# Patient Record
Sex: MALE | Race: WHITE | NOT HISPANIC OR LATINO | Employment: OTHER | URBAN - NONMETROPOLITAN AREA
[De-identification: names, ages, dates, MRNs, and addresses within clinical notes are randomized per-mention and may not be internally consistent; named-entity substitution may affect disease eponyms.]

---

## 2019-02-26 ENCOUNTER — OFFICE VISIT (OUTPATIENT)
Dept: CARDIAC SURGERY | Facility: CLINIC | Age: 74
End: 2019-02-26

## 2019-02-26 VITALS
SYSTOLIC BLOOD PRESSURE: 137 MMHG | HEART RATE: 65 BPM | BODY MASS INDEX: 27.58 KG/M2 | WEIGHT: 182 LBS | DIASTOLIC BLOOD PRESSURE: 76 MMHG | OXYGEN SATURATION: 97 % | HEIGHT: 68 IN

## 2019-02-26 DIAGNOSIS — I65.23 BILATERAL CAROTID ARTERY STENOSIS: Primary | ICD-10-CM

## 2019-02-26 PROCEDURE — 99213 OFFICE O/P EST LOW 20 MIN: CPT | Performed by: THORACIC SURGERY (CARDIOTHORACIC VASCULAR SURGERY)

## 2019-02-26 RX ORDER — GLUCOSAMINE SULFATE 500 MG
1000 CAPSULE ORAL DAILY
COMMUNITY

## 2019-02-26 RX ORDER — RANITIDINE 300 MG/1
300 TABLET ORAL NIGHTLY
COMMUNITY
End: 2020-11-19

## 2019-02-26 RX ORDER — LEVOTHYROXINE SODIUM 0.05 MG/1
50 TABLET ORAL DAILY
COMMUNITY

## 2019-03-15 DIAGNOSIS — Z00.6 EXAMINATION FOR NORMAL COMPARISON FOR CLINICAL RESEARCH: Primary | ICD-10-CM

## 2020-09-15 ENCOUNTER — OFFICE VISIT (OUTPATIENT)
Dept: CARDIAC SURGERY | Facility: CLINIC | Age: 75
End: 2020-09-15

## 2020-09-15 ENCOUNTER — TELEPHONE (OUTPATIENT)
Dept: CARDIAC SURGERY | Facility: CLINIC | Age: 75
End: 2020-09-15

## 2020-09-15 VITALS
OXYGEN SATURATION: 97 % | TEMPERATURE: 98.2 F | HEART RATE: 74 BPM | DIASTOLIC BLOOD PRESSURE: 81 MMHG | HEIGHT: 68 IN | BODY MASS INDEX: 28.52 KG/M2 | WEIGHT: 188.2 LBS | SYSTOLIC BLOOD PRESSURE: 144 MMHG

## 2020-09-15 DIAGNOSIS — I65.23 BILATERAL CAROTID ARTERY STENOSIS: Primary | ICD-10-CM

## 2020-09-15 PROCEDURE — 99212 OFFICE O/P EST SF 10 MIN: CPT | Performed by: PHYSICIAN ASSISTANT

## 2020-09-15 RX ORDER — OMEPRAZOLE 20 MG/1
1 CAPSULE, DELAYED RELEASE ORAL DAILY
COMMUNITY
Start: 2020-08-12

## 2020-09-15 NOTE — PROGRESS NOTES
09/15/2020  Patient Information  Wiliam Bergeron                                                                                          [address] [place] [id number]   1945  'PCP/Referring Physician'  Otis Zuñiga MD  [phone]  No ref. provider found    Chief Complaint   Patient presents with   • Carotid Artery Disease     Follow-up with results from CTA neck for carotid artery stenosis        History of Present Illness:  75-year-old male with a history of hypertension, hyperlipidemia, diabetes mellitus and remote tobacco use.  Patient known to our practice having had a right carotid endarterectomy performed on 8/4/2016 by Dr. Navarro.  He did well from that surgery.  He reports at the time there was little to no disease noted in his left carotid artery.  Dr. Mom has since left the practice and the patient was last seen here according to notes on 2/26/2019 by Dr. Mondragon.  He was told during that follow-up that he did not need any surgery on his left carotid and that he would be followed up in 2 years with a surveilling carotid duplex.  Patient reports over the last month having a couple of instances of what sounds like near syncopal episodes.  Initially believed it was due to a thyroid  medicine that he was prescribed.  He reported this to his physician Dr. Zuñiga in [facility].  Initially sent for a carotid duplex scan which reportedly did not show any significant disease in the carotid arteries.  However his physician Dr. Zuñiga felt that further evaluation was warranted.  He ordered a CTA of the carotids.  Patient is is here today for evaluation and review of the images.  Per the report he has a 70% stenosis of his left internal carotid artery.  Currently is having no symptoms.  Denies weakness on his right or left side.  Denies visual changes.  Denies speech changes    Patient Active Problem List   Diagnosis   • Carotid stenosis   • Hypertension   • Hyperlipidemia   • Urinary  retention     Past Medical History:   Diagnosis Date   • Arthritis    • Back pain    • Carotid artery disease (CMS/HCC)    • Cataract    • Chronic kidney disease (CKD)    • Colon cancer (CMS/HCC)    • Creatinine elevation     MONITORED BY FMD-DR. BJ POON, GETS CHECKED EVERY FEW MONTHS   • Depression    • Diabetes (CMS/HCC)    • Dizziness    • GERD (gastroesophageal reflux disease)    • Hepatitis    • Hyperlipidemia    • Hypertension     CHECKS AT HOME, SAYS HAS BEEN RUNNING JUST A LITTLE LOW RECENTLY BUT NOT EXTREMELY LOW   • Inguinal hernia     right side   • Kidney disease    • PONV (postoperative nausea and vomiting)     ONLY WITH COLON SURGERY   • Skin cancer     right cheek   • Tobacco abuse, in remission    • Wears dentures    • Wears glasses      Past Surgical History:   Procedure Laterality Date   • CAROTID ENDARTERECTOMY      right   • CAROTID ENDARTERECTOMY Right 8/4/2016    Procedure: RIGHT CAROTID ENDARTERECTOMY WITH EEG;  Surgeon: Williams Navarro MD;  Location: [facility] OR;  Service:    • CATARACT EXTRACTION Left    • CEREBRAL ANGIOGRAM Bilateral 06/28/2016   • COLON RESECTION     • COLONOSCOPY  2006    polyps   • GALLBLADDER SURGERY     • HERNIA REPAIR Right     ingunial   • INTERVENTIONAL RADIOLOGY PROCEDURE Bilateral 6/28/2016    Procedure: Carotid Cerebral Angiogram;  Surgeon: Teddy Lubin MD;  Location:  [facility] CATH INVASIVE LOCATION;  Service:    • SKIN CANCER EXCISION      right cheek, with graft from below eyebrow       Current Outpatient Medications:   •  aspirin  MG EC tablet, Take 1 tablet by mouth daily. (Patient taking differently: Take 81 mg by mouth Daily.), Disp: 30 tablet, Rfl: 2  •  enalapril (VASOTEC) 20 MG tablet, Take 10 mg by mouth 2 (Two) Times a Day., Disp: , Rfl:   •  Glucosamine 500 MG capsule, Take 1,000 mg by mouth Daily., Disp: , Rfl:   •  levothyroxine (SYNTHROID, LEVOTHROID) 50 MCG tablet, Take 50 mcg by mouth Daily., Disp: , Rfl:   •  metoprolol tartrate  (LOPRESSOR) 50 MG tablet, Take 25 mg by mouth 2 (two) times a day., Disp: , Rfl:   •  Multiple Vitamin (MULTI VITAMIN DAILY PO), Take 1 tablet by mouth daily., Disp: , Rfl:   •  NIFEdipine XL (PROCARDIA XL) 90 MG 24 hr tablet, Take 90 mg by mouth daily., Disp: , Rfl:   •  Omega-3 Fatty Acids (FISH OIL) 1000 MG capsule capsule, Take 1 capsule by mouth daily with breakfast., Disp: , Rfl:   •  omeprazole (priLOSEC) 20 MG capsule, Take 1 capsule by mouth Daily., Disp: , Rfl:   •  rosuvastatin (CRESTOR) 20 MG tablet, Take 20 mg by mouth daily., Disp: , Rfl:   •  oxyCODONE-acetaminophen (PERCOCET) 5-325 MG per tablet, Take 1 tablet by mouth 1 (one) time as needed for moderate pain (4-6) (1 tablet every 6 hours prn)., Disp: , Rfl:   •  pantoprazole (PROTONIX) 40 MG EC tablet, Take 40 mg by mouth daily., Disp: , Rfl:   •  raNITIdine (ZANTAC) 300 MG tablet, Take 300 mg by mouth Every Night., Disp: , Rfl:   Allergies   Allergen Reactions   • Augmentin [Amoxicillin-Pot Clavulanate] Rash   • Penicillins Rash     Social History     Socioeconomic History   • Marital status:      Spouse name: Not on file   • Number of children: 2   • Years of education: 13   • Highest education level: Not on file   Occupational History   • Occupation: RETIRED/[employer]     Comment: [employer]    Tobacco Use   • Smoking status: Former Smoker     Packs/day: 3.00     Years: 30.00     Pack years: 90.00     Types: Cigarettes     Quit date: 6/10/2000     Years since quittin.2   • Smokeless tobacco: Former User     Types: Chew     Quit date:    Substance and Sexual Activity   • Alcohol use: No     Comment: QUIT IN ; PT SELF ADMITTED ALCOHOLIC, DRANK FOR MANY YEARS OF DRINKING   • Drug use: No   • Sexual activity: Defer   Social History Narrative    Wife  6 months ago. He still gets tearful. Stop drinking alcohol 28 years ago. Lives alone in [facility]     Family History   Problem Relation Age of Onset   •  "Bone cancer Mother    • Stroke Mother    • Hypertension Mother    • Colon cancer Father      Review of Systems   Constitution: Negative for chills, fever, malaise/fatigue, night sweats and weight loss.   HENT: Negative for hearing loss, odynophagia and sore throat.    Cardiovascular: Positive for chest pain. Negative for dyspnea on exertion, leg swelling, orthopnea and palpitations.   Respiratory: Negative for cough and hemoptysis.    Endocrine: Negative for cold intolerance, heat intolerance, polydipsia, polyphagia and polyuria.   Hematologic/Lymphatic: Does not bruise/bleed easily.   Skin: Negative for itching and rash.   Musculoskeletal: Positive for back pain. Negative for joint pain, joint swelling and myalgias.   Gastrointestinal: Positive for constipation. Negative for abdominal pain, diarrhea, hematemesis, hematochezia, melena, nausea and vomiting.   Genitourinary: Negative for dysuria, frequency and hematuria.   Neurological: Positive for dizziness. Negative for focal weakness, headaches, numbness and seizures.   Psychiatric/Behavioral: Negative for depression and suicidal ideas. The patient is not nervous/anxious.    All other systems reviewed and are negative.    Vitals:    09/15/20 1109   BP: 144/81   BP Location: Left arm   Patient Position: Sitting   Pulse: 74   Temp: 98.2 °F (36.8 °C)   SpO2: 97%   Weight: 85.4 kg (188 lb 3.2 oz)   Height: 172.7 cm (68\")      Physical Exam  Cardiovascular: Regular rate and rhythm no murmur rub or gallop  Respiratory: Unlabored and equal bilaterally clear to auscultation no wheezes rales rubs  Extremities: Warm to the touch no significant edema palpable peripheral pulses in upper lower extremities  Neuro: Equal bilateral strength in upper lower extremities.  Moves all extremities equally.  No focal deficits noted on exam  The past medical history, surgical history, family history, social history, review of systems and vitals were reviewed by myself and corrected as " needed.      Labs/Imaging:    Assessment/Plan:  75-year-old male with a history of hypertension, hyperlipidemia, diabetes mellitus and remote tobacco use.  Patient known to our practice having had a right carotid endarterectomy performed on 8/4/2016 by Dr. Navarro.  He did well from that surgery.  He reports at the time there was little to no disease noted in his left carotid artery.  Dr. Mom has since left the practice and the patient was last seen here according to notes on 2/26/2019 by Dr. Mondragon.  He was told during that follow-up that he did not need any surgery on his left carotid and that he would be followed up in 2 years with a surveilling carotid duplex.  Patient reports over the last month having a couple of instances of what sounds like near syncopal episodes.  Initially believed it was due to a thyroid  medicine that he was prescribed.  He reported this to his physician Dr. Zuñiga in [facility].  Initially sent for a carotid duplex scan which reportedly did not show any significant disease in the carotid arteries.  However his physician Dr. Zuñiga felt that further evaluation was warranted.  He ordered a CTA of the carotids.  Patient is is here today for evaluation and review of the images.  Per the report he has a 70% stenosis of his left internal carotid artery.  I reviewed the CTA of the carotids and duplex with Dr. Mondragon.  Based on velocities and images it appears that the stenosis in his left carotid artery is basically unchanged from her previous study from about 2 years ago.  It is unclear what the etiology of his symptoms are at this time.  Patient does report he has had significant stress since the loss of his wife about 4 years ago.  As mentioned above 2 he is also been on some medication changes with thyroid medications.  We will check his orthostatic blood pressures in office today.  Otherwise on follow-up for his carotid disease we will see him back in 1 year with a carotid duplex  study.  He mentions again today as well as in the last visit 2 years ago that he does have pain in his calves bilaterally with walking.  He is pretty active and maintains an active lifestyle.  On physical exam he had palpable peripheral pulses in his lower extremities he does have a history of SAUL test that were reportedly normal.  We offered to repeat this but at this time he does not want to pursue that.  He notes that he has been told in the past did perform certain exercises that he had forgotten about.  He recently started reperform a nose exercises and feels that his symptoms his lower extremities are improving.  He states he rather just continue this at this time and will let us know if any further changes occur and he warrants further work-up.  Therefore we will plan to see him back in 1 year with a carotid duplex unless he needs this.  He was instructed to call the office with any changes especially signs and symptoms of a stroke.  He understands this plan and agrees.  Patient Active Problem List   Diagnosis   • Carotid stenosis   • Hypertension   • Hyperlipidemia   • Urinary retention

## 2020-09-15 NOTE — TELEPHONE ENCOUNTER
I have left a message with Dr. Otis Zuñiga's office to make appointment for Mr. Bergeron for orthostatic hypotension.     Pritesh

## 2020-09-18 DIAGNOSIS — Z00.6 EXAMINATION FOR NORMAL COMPARISON FOR CLINICAL RESEARCH: Primary | ICD-10-CM

## 2020-11-09 ENCOUNTER — TELEPHONE (OUTPATIENT)
Dept: CARDIOLOGY | Facility: CLINIC | Age: 75
End: 2020-11-09

## 2020-11-09 NOTE — TELEPHONE ENCOUNTER
RECEIVED CARDIAC CLEARANCE FROM DR. MURILLO FOR LEFT CEA-     SCANNED INTO SHARED FOLDER    GAVE TO PATRICIA DENNY

## 2020-11-19 ENCOUNTER — OFFICE VISIT (OUTPATIENT)
Dept: CARDIOLOGY | Facility: CLINIC | Age: 75
End: 2020-11-19

## 2020-11-19 VITALS
TEMPERATURE: 98 F | SYSTOLIC BLOOD PRESSURE: 122 MMHG | BODY MASS INDEX: 29.31 KG/M2 | WEIGHT: 193.4 LBS | OXYGEN SATURATION: 95 % | HEART RATE: 67 BPM | DIASTOLIC BLOOD PRESSURE: 70 MMHG | HEIGHT: 68 IN

## 2020-11-19 DIAGNOSIS — I10 ESSENTIAL HYPERTENSION: ICD-10-CM

## 2020-11-19 DIAGNOSIS — Z01.818 PRE-OPERATIVE CLEARANCE: ICD-10-CM

## 2020-11-19 DIAGNOSIS — I65.22 LEFT CAROTID STENOSIS: Primary | ICD-10-CM

## 2020-11-19 DIAGNOSIS — R06.02 SOB (SHORTNESS OF BREATH): ICD-10-CM

## 2020-11-19 DIAGNOSIS — R07.2 PRECORDIAL PAIN: ICD-10-CM

## 2020-11-19 PROCEDURE — 93000 ELECTROCARDIOGRAM COMPLETE: CPT | Performed by: PHYSICIAN ASSISTANT

## 2020-11-19 PROCEDURE — 99204 OFFICE O/P NEW MOD 45 MIN: CPT | Performed by: PHYSICIAN ASSISTANT

## 2020-11-19 RX ORDER — MELATONIN
1000 DAILY
COMMUNITY

## 2020-11-19 RX ORDER — ASPIRIN 81 MG/1
81 TABLET ORAL DAILY
COMMUNITY

## 2020-11-19 NOTE — PROGRESS NOTES
Subjective   Wiliam Bergeron is a 75 y.o. male     Chief Complaint   Patient presents with   • Establish Care     presents for cardiac clearance (L carotid endarterectomy Josafat)   • Chest Pain   • Carotid Artery Disease   • Shortness of Breath       HPI  The patient presents for initial evaluation, referred for cardiac clearance prior to left carotid endarterectomy through neurosurgery.  The patient has history of vascular disease/PVD, as he is status post right carotid endarterectomy approximately 4 years or so ago by patient report.  The patient at that time did have a catheterization in the preoperative setting.  Catheterization by his report was unremarkable.  The patient had done well and was recently evaluated to his primary care provider, where a screening carotid duplex was ordered just for routine evaluation.  This suggested potentially hemodynamically significant disease of the left carotid system.  This was eventually evaluated through neurosurgery who has now recommended carotid endarterectomy for the same.    Symptomatically, the patient does fairly well.  He does have episodes of chest discomfort at times, nothing really of significance.  He can have episodes of chest tightness and aching at times.  He has no referral to the neck, arm, or jaw.  His dyspnea has progressed slightly, although nothing of significance.  He denies failure or dysrhythmic symptoms at this time.  Exercise capacity is limited at this time, and can be minimized significantly with increasing dyspnea at higher levels.  Occasionally he will have chest discomfort at that time as well.  Blood pressures have been fairly well controlled on current medical regimen.  The patient has no further complaints.      Current Outpatient Medications   Medication Sig Dispense Refill   • aspirin (aspirin) 81 MG EC tablet Take 81 mg by mouth Daily.     • cholecalciferol (VITAMIN D3) 25 MCG (1000 UT) tablet Take 1,000 Units by mouth Daily.     •  enalapril (VASOTEC) 20 MG tablet Take 10 mg by mouth 2 (Two) Times a Day.     • Glucosamine 500 MG capsule Take 1,000 mg by mouth Daily.     • levothyroxine (SYNTHROID, LEVOTHROID) 50 MCG tablet Take 50 mcg by mouth Daily.     • metoprolol tartrate (LOPRESSOR) 50 MG tablet Take 25 mg by mouth 2 (two) times a day.     • Multiple Vitamin (MULTI VITAMIN DAILY PO) Take 1 tablet by mouth daily.     • NIFEdipine XL (PROCARDIA XL) 90 MG 24 hr tablet Take 90 mg by mouth daily.     • Omega-3 Fatty Acids (FISH OIL) 1000 MG capsule capsule Take 1 capsule by mouth daily with breakfast.     • omeprazole (priLOSEC) 20 MG capsule Take 1 capsule by mouth Daily.     • rosuvastatin (CRESTOR) 20 MG tablet Take 20 mg by mouth daily.       No current facility-administered medications for this visit.      Facility-Administered Medications Ordered in Other Visits   Medication Dose Route Frequency Provider Last Rate Last Admin   • technetium sestamibi (CARDIOLITE) injection 1 dose  1 dose Intravenous Once in imaging Daniel Dyer MD           Augmentin [amoxicillin-pot clavulanate] and Penicillins    Past Medical History:   Diagnosis Date   • Arthritis    • Back pain    • Carotid artery disease (CMS/HCC)    • Cataract    • Chronic kidney disease (CKD)    • Colon cancer (CMS/HCC)    • Creatinine elevation     MONITORED BY FMD-DR. JB POON, GETS CHECKED EVERY FEW MONTHS   • Depression    • Diabetes (CMS/HCC)    • Dizziness    • GERD (gastroesophageal reflux disease)    • Hepatitis    • Hyperlipidemia    • Hypertension     CHECKS AT HOME, SAYS HAS BEEN RUNNING JUST A LITTLE LOW RECENTLY BUT NOT EXTREMELY LOW   • Inguinal hernia     right side   • Kidney disease    • PONV (postoperative nausea and vomiting)     ONLY WITH COLON SURGERY   • Skin cancer     right cheek   • Tobacco abuse, in remission    • Wears dentures    • Wears glasses        Social History     Socioeconomic History   • Marital status:      Spouse name: Not on  file   • Number of children: 2   • Years of education: 13   • Highest education level: Not on file   Occupational History   • Occupation: RETIRED/[employer]     Comment: [employer]    Tobacco Use   • Smoking status: Former Smoker     Packs/day: 3.00     Years: 30.00     Pack years: 90.00     Types: Cigarettes     Quit date: 6/10/2000     Years since quittin.4   • Smokeless tobacco: Former User     Types: Chew     Quit date:    Substance and Sexual Activity   • Alcohol use: No     Comment: QUIT IN ; PT SELF ADMITTED ALCOHOLIC, DRANK FOR MANY YEARS OF DRINKING   • Drug use: No   • Sexual activity: Defer   Social History Narrative    Wife  6 months ago. He still gets tearful. Stop drinking alcohol 28 years ago. Lives alone in [facility]       Family History   Problem Relation Age of Onset   • Bone cancer Mother    • Stroke Mother    • Hypertension Mother    • Colon cancer Father        Review of Systems   Constitutional: Negative.  Negative for chills, diaphoresis, fatigue and fever.   Eyes: Positive for visual disturbance.   Respiratory: Positive for shortness of breath (on exertion). Negative for apnea, cough, chest tightness and wheezing.    Cardiovascular: Positive for chest pain (precordial pain radiates down L arm times). Negative for palpitations and leg swelling.   Gastrointestinal: Negative.  Negative for abdominal pain, blood in stool, constipation, diarrhea, nausea and vomiting.   Endocrine: Negative.    Genitourinary: Negative.  Negative for hematuria.   Musculoskeletal: Positive for neck pain. Negative for arthralgias, back pain and myalgias.        BLE pain with activity   Skin: Negative.    Allergic/Immunologic: Positive for environmental allergies.   Neurological: Negative.  Negative for dizziness (none since summer), syncope, weakness, light-headedness, numbness and headaches.   Hematological: Negative.  Does not bruise/bleed easily.   Psychiatric/Behavioral:  "Negative.  Negative for agitation and sleep disturbance. The patient is not nervous/anxious.        Objective     Vitals:    11/19/20 0927   BP: 122/70   BP Location: Left arm   Patient Position: Sitting   Pulse: 67   Temp: 98 °F (36.7 °C)   SpO2: 95%   Weight: 87.7 kg (193 lb 6.4 oz)   Height: 172.7 cm (68\")        /70 (BP Location: Left arm, Patient Position: Sitting)   Pulse 67   Temp 98 °F (36.7 °C)   Ht 172.7 cm (68\")   Wt 87.7 kg (193 lb 6.4 oz)   SpO2 95%   BMI 29.41 kg/m²      Lab Results (most recent)     None          Physical Exam  Vitals signs and nursing note reviewed.   Constitutional:       General: He is not in acute distress.     Appearance: He is well-developed.   HENT:      Head: Normocephalic and atraumatic.   Eyes:      Conjunctiva/sclera: Conjunctivae normal.      Pupils: Pupils are equal, round, and reactive to light.   Neck:      Musculoskeletal: Normal range of motion and neck supple.      Vascular: No JVD.      Trachea: No tracheal deviation.   Cardiovascular:      Rate and Rhythm: Normal rate and regular rhythm.      Heart sounds: Normal heart sounds.   Pulmonary:      Effort: Pulmonary effort is normal.      Breath sounds: Normal breath sounds.   Abdominal:      General: Bowel sounds are normal. There is no distension.      Palpations: Abdomen is soft. There is no mass.      Tenderness: There is no abdominal tenderness. There is no guarding or rebound.   Musculoskeletal: Normal range of motion.         General: No tenderness or deformity.   Skin:     General: Skin is warm and dry.      Coloration: Skin is not pale.      Findings: No erythema or rash.   Neurological:      Mental Status: He is alert and oriented to person, place, and time.   Psychiatric:         Behavior: Behavior normal.         Thought Content: Thought content normal.         Judgment: Judgment normal.         Procedure     ECG 12 Lead    Date/Time: 11/19/2020 9:51 AM  Performed by: Shlomo Zaldivar, " PA  Authorized by: Shlomo Zaldivar PA   Previous ECG: no previous ECG available  Comments: Sinus rhythm at 62, normal axis, borderline first-degree AV block, no acute changes noted.                 Assessment/Plan      Diagnosis Plan   1. Left carotid stenosis  ECG 12 Lead    Adult Transthoracic Echo Complete W/ Cont if Necessary Per Protocol    Stress Test With Myocardial Perfusion One Day   2. Pre-operative clearance  Adult Transthoracic Echo Complete W/ Cont if Necessary Per Protocol    Stress Test With Myocardial Perfusion One Day   3. Precordial pain  ECG 12 Lead    Adult Transthoracic Echo Complete W/ Cont if Necessary Per Protocol    Stress Test With Myocardial Perfusion One Day   4. SOB (shortness of breath)  Adult Transthoracic Echo Complete W/ Cont if Necessary Per Protocol    Stress Test With Myocardial Perfusion One Day   5. Essential hypertension  ECG 12 Lead    Adult Transthoracic Echo Complete W/ Cont if Necessary Per Protocol    Stress Test With Myocardial Perfusion One Day     1.  The patient is referred for preoperative cardiac evaluation.  He is pending left carotid endarterectomy with neurosurgery.  He has history of right carotid endarterectomy, 4 years or so ago.  We have been asked to evaluate him and comment on cardiac status in the operative setting.    2.  I would like for the patient to have cardiac testing prior to surgery.  He will be scheduled for expedited nuclear stress test for ischemia assessment.    3.  We will schedule for an echocardiogram as well to evaluate LV size and function, valvular morphologies, etc.    4.  The patient appears to be doing reasonably well otherwise.  I would continue medical regimen.  He will monitor blood pressures closely at home and call to us for any issues in that regard.  I would make no changes at this time.    5.  We will see him as soon as the above studies are available and recommend him further at that time.  He will call for any issues.            Wiliam Bergeron  reports that he quit smoking about 20 years ago. His smoking use included cigarettes. He has a 90.00 pack-year smoking history. He quit smokeless tobacco use about 35 years ago.  His smokeless tobacco use included chew..         Patient's Body mass index is 29.41 kg/m². BMI is above normal parameters. Recommendations include: educational material.     Advance Care Planning   ACP discussion was held with the patient during this visit. Patient does not have an advance directive, declines further assistance.      Electronically signed by:

## 2020-11-19 NOTE — PATIENT INSTRUCTIONS

## 2020-11-20 ENCOUNTER — HOSPITAL ENCOUNTER (OUTPATIENT)
Dept: CARDIOLOGY | Facility: HOSPITAL | Age: 75
Discharge: HOME | End: 2020-11-20

## 2020-11-20 DIAGNOSIS — R07.2 PRECORDIAL PAIN: ICD-10-CM

## 2020-11-20 DIAGNOSIS — I65.23 BILATERAL CAROTID ARTERY STENOSIS: ICD-10-CM

## 2020-11-20 DIAGNOSIS — Z01.818 PRE-OPERATIVE CLEARANCE: ICD-10-CM

## 2020-11-20 DIAGNOSIS — I10 ESSENTIAL HYPERTENSION: ICD-10-CM

## 2020-11-20 DIAGNOSIS — R06.02 SOB (SHORTNESS OF BREATH): ICD-10-CM

## 2020-11-20 PROCEDURE — 78452 HT MUSCLE IMAGE SPECT MULT: CPT

## 2020-11-20 PROCEDURE — 93306 TTE W/DOPPLER COMPLETE: CPT

## 2020-11-20 PROCEDURE — 0 TECHNETIUM SESTAMIBI: Performed by: INTERNAL MEDICINE

## 2020-11-20 PROCEDURE — 93356 MYOCRD STRAIN IMG SPCKL TRCK: CPT | Performed by: INTERNAL MEDICINE

## 2020-11-20 PROCEDURE — A9500 TC99M SESTAMIBI: HCPCS | Performed by: INTERNAL MEDICINE

## 2020-11-20 PROCEDURE — 93306 TTE W/DOPPLER COMPLETE: CPT | Performed by: INTERNAL MEDICINE

## 2020-11-20 PROCEDURE — 93356 MYOCRD STRAIN IMG SPCKL TRCK: CPT

## 2020-11-20 PROCEDURE — 78452 HT MUSCLE IMAGE SPECT MULT: CPT | Performed by: INTERNAL MEDICINE

## 2020-11-20 PROCEDURE — 93017 CV STRESS TEST TRACING ONLY: CPT

## 2020-11-20 PROCEDURE — 93018 CV STRESS TEST I&R ONLY: CPT | Performed by: INTERNAL MEDICINE

## 2020-11-20 PROCEDURE — 93016 CV STRESS TEST SUPVJ ONLY: CPT | Performed by: NURSE PRACTITIONER

## 2020-11-20 RX ADMIN — TECHNETIUM TC 99M SESTAMIBI 1 DOSE: 1 INJECTION INTRAVENOUS at 12:30

## 2020-11-20 RX ADMIN — TECHNETIUM TC 99M SESTAMIBI 1 DOSE: 1 INJECTION INTRAVENOUS at 12:29

## 2020-11-22 LAB
BH CV STRESS RECOVERY BP: NORMAL MMHG
BH CV STRESS RECOVERY HR: 85 BPM
MAXIMAL PREDICTED HEART RATE: 144 BPM
PERCENT MAX PREDICTED HR: 115.97 %
STRESS BASELINE BP: NORMAL MMHG
STRESS BASELINE HR: 76 BPM
STRESS PERCENT HR: 136 %
STRESS POST ESTIMATED WORKLOAD: 7 METS
STRESS POST EXERCISE DUR MIN: 5 MIN
STRESS POST EXERCISE DUR SEC: 40 SEC
STRESS POST PEAK BP: NORMAL MMHG
STRESS POST PEAK HR: 167 BPM
STRESS TARGET HR: 122 BPM

## 2020-11-23 ENCOUNTER — TELEPHONE (OUTPATIENT)
Dept: CARDIOLOGY | Facility: CLINIC | Age: 75
End: 2020-11-23

## 2020-11-23 LAB
BH CV ECHO MEAS - ACS: 2.1 CM
BH CV ECHO MEAS - AO MAX PG: 5.4 MMHG
BH CV ECHO MEAS - AO MEAN PG: 3 MMHG
BH CV ECHO MEAS - AO ROOT AREA (BSA CORRECTED): 1.6
BH CV ECHO MEAS - AO ROOT AREA: 8.3 CM^2
BH CV ECHO MEAS - AO ROOT DIAM: 3.3 CM
BH CV ECHO MEAS - AO V2 MAX: 116 CM/SEC
BH CV ECHO MEAS - AO V2 MEAN: 83.5 CM/SEC
BH CV ECHO MEAS - AO V2 VTI: 29.6 CM
BH CV ECHO MEAS - BSA(HAYCOCK): 2.1 M^2
BH CV ECHO MEAS - BSA: 2 M^2
BH CV ECHO MEAS - BZI_BMI: 29.3 KILOGRAMS/M^2
BH CV ECHO MEAS - BZI_METRIC_HEIGHT: 172.7 CM
BH CV ECHO MEAS - BZI_METRIC_WEIGHT: 87.5 KG
BH CV ECHO MEAS - EDV(CUBED): 68.9 ML
BH CV ECHO MEAS - EDV(MOD-SP4): 82.4 ML
BH CV ECHO MEAS - EDV(TEICH): 74.2 ML
BH CV ECHO MEAS - EF(CUBED): 75.1 %
BH CV ECHO MEAS - EF(MOD-SP4): 39.9 %
BH CV ECHO MEAS - EF(TEICH): 67.5 %
BH CV ECHO MEAS - ESV(CUBED): 17.2 ML
BH CV ECHO MEAS - ESV(MOD-SP4): 49.5 ML
BH CV ECHO MEAS - ESV(TEICH): 24.1 ML
BH CV ECHO MEAS - FS: 37.1 %
BH CV ECHO MEAS - IVS/LVPW: 1.2
BH CV ECHO MEAS - IVSD: 1.7 CM
BH CV ECHO MEAS - LA DIMENSION: 4 CM
BH CV ECHO MEAS - LA/AO: 1.2
BH CV ECHO MEAS - LV DIASTOLIC VOL/BSA (35-75): 40.9 ML/M^2
BH CV ECHO MEAS - LV IVRT: 0.11 SEC
BH CV ECHO MEAS - LV MASS(C)D: 264.3 GRAMS
BH CV ECHO MEAS - LV MASS(C)DI: 131.3 GRAMS/M^2
BH CV ECHO MEAS - LV SYSTOLIC VOL/BSA (12-30): 24.6 ML/M^2
BH CV ECHO MEAS - LVIDD: 4.1 CM
BH CV ECHO MEAS - LVIDS: 2.6 CM
BH CV ECHO MEAS - LVLD AP4: 7.2 CM
BH CV ECHO MEAS - LVLS AP4: 6.5 CM
BH CV ECHO MEAS - LVOT AREA (M): 3.8 CM^2
BH CV ECHO MEAS - LVOT AREA: 3.8 CM^2
BH CV ECHO MEAS - LVOT DIAM: 2.2 CM
BH CV ECHO MEAS - LVPWD: 1.4 CM
BH CV ECHO MEAS - MV A MAX VEL: 88.7 CM/SEC
BH CV ECHO MEAS - MV DEC SLOPE: 347 CM/SEC^2
BH CV ECHO MEAS - MV E MAX VEL: 74.6 CM/SEC
BH CV ECHO MEAS - MV E/A: 0.84
BH CV ECHO MEAS - RAP SYSTOLE: 10 MMHG
BH CV ECHO MEAS - RVDD: 3.3 CM
BH CV ECHO MEAS - RVSP: 34.2 MMHG
BH CV ECHO MEAS - SI(AO): 122 ML/M^2
BH CV ECHO MEAS - SI(CUBED): 25.7 ML/M^2
BH CV ECHO MEAS - SI(MOD-SP4): 16.3 ML/M^2
BH CV ECHO MEAS - SI(TEICH): 24.9 ML/M^2
BH CV ECHO MEAS - SV(AO): 245.6 ML
BH CV ECHO MEAS - SV(CUBED): 51.7 ML
BH CV ECHO MEAS - SV(MOD-SP4): 32.9 ML
BH CV ECHO MEAS - SV(TEICH): 50.1 ML
BH CV ECHO MEAS - TR MAX VEL: 246 CM/SEC
MAXIMAL PREDICTED HEART RATE: 144 BPM
STRESS TARGET HR: 122 BPM

## 2021-03-23 ENCOUNTER — OFFICE VISIT (OUTPATIENT)
Dept: CARDIOLOGY | Facility: CLINIC | Age: 76
End: 2021-03-23

## 2021-03-23 VITALS
WEIGHT: 192.4 LBS | DIASTOLIC BLOOD PRESSURE: 78 MMHG | HEART RATE: 70 BPM | SYSTOLIC BLOOD PRESSURE: 144 MMHG | BODY MASS INDEX: 29.16 KG/M2 | OXYGEN SATURATION: 92 % | HEIGHT: 68 IN

## 2021-03-23 DIAGNOSIS — R06.02 SHORTNESS OF BREATH: ICD-10-CM

## 2021-03-23 DIAGNOSIS — I73.9 PVD (PERIPHERAL VASCULAR DISEASE) (HCC): Primary | ICD-10-CM

## 2021-03-23 DIAGNOSIS — I10 ESSENTIAL HYPERTENSION: ICD-10-CM

## 2021-03-23 PROCEDURE — 99213 OFFICE O/P EST LOW 20 MIN: CPT | Performed by: PHYSICIAN ASSISTANT

## 2021-03-23 NOTE — PROGRESS NOTES
Problem list     Subjective   Wiliam Bergeron is a 75 y.o. male     Chief Complaint   Patient presents with   • Carotid Artery Disease     presents for stress and echo f/u (carotid endarterectomy 12/220)   • Palpitations   • Hypertension   Problem List:  1.  Peripheral vascular disease.  1.1.  History of right carotid endarterectomy, 2016.  1.2.  Left carotid endarterectomy, 12/2020.  2.  Low risk stress test, 11/2020.  3.  Preserved systolic function  4.  Hypertension  5.  Dyslipidemia    HPI  The patient presents to the clinic today for routine follow-up.  This gentleman carries history of vascular disease, status post right carotid endarterectomy 4 years ago and most recently left carotid endarterectomy in December, 2020.  He has healed well from his most recent endarterectomy.  He did well in the valeria and postoperative setting.  We have been asked to see him prior to his left carotid endarterectomy for clearance from cardiac standpoint.  His stress and an echo at that time were benign.  The patient currently is doing well.  He has no chest pain.  He has stable dyspnea.  He has no failure symptoms.  He has no further complaints.    Current Outpatient Medications on File Prior to Visit   Medication Sig Dispense Refill   • aspirin (aspirin) 81 MG EC tablet Take 81 mg by mouth Daily.     • cholecalciferol (VITAMIN D3) 25 MCG (1000 UT) tablet Take 1,000 Units by mouth Daily.     • enalapril (VASOTEC) 20 MG tablet Take 10 mg by mouth 2 (Two) Times a Day.     • Glucosamine 500 MG capsule Take 1,000 mg by mouth Daily.     • levothyroxine (SYNTHROID, LEVOTHROID) 50 MCG tablet Take 50 mcg by mouth Daily.     • metoprolol tartrate (LOPRESSOR) 50 MG tablet Take 25 mg by mouth 2 (two) times a day.     • Multiple Vitamin (MULTI VITAMIN DAILY PO) Take 1 tablet by mouth daily.     • NIFEdipine XL (PROCARDIA XL) 90 MG 24 hr tablet Take 90 mg by mouth daily.     • Omega-3 Fatty Acids (FISH OIL) 1000 MG capsule capsule Take 1  capsule by mouth daily with breakfast.     • omeprazole (priLOSEC) 20 MG capsule Take 1 capsule by mouth Daily.     • rosuvastatin (CRESTOR) 20 MG tablet Take 20 mg by mouth daily.       No current facility-administered medications on file prior to visit.       Augmentin [amoxicillin-pot clavulanate] and Penicillins    Past Medical History:   Diagnosis Date   • Arthritis    • Back pain    • Carotid artery disease (CMS/HCC)    • Cataract    • Chronic kidney disease (CKD)    • Colon cancer (CMS/HCC)    • Creatinine elevation     MONITORED BY FMD-DR. BJ POON, GETS CHECKED EVERY FEW MONTHS   • Depression    • Diabetes (CMS/HCC)    • Dizziness    • GERD (gastroesophageal reflux disease)    • Hepatitis    • Hyperlipidemia    • Hypertension     CHECKS AT HOME, SAYS HAS BEEN RUNNING JUST A LITTLE LOW RECENTLY BUT NOT EXTREMELY LOW   • Inguinal hernia     right side   • Kidney disease    • PONV (postoperative nausea and vomiting)     ONLY WITH COLON SURGERY   • Skin cancer     right cheek   • Tobacco abuse, in remission    • Wears dentures    • Wears glasses        Social History     Socioeconomic History   • Marital status:      Spouse name: Not on file   • Number of children: 2   • Years of education: 13   • Highest education level: Not on file   Tobacco Use   • Smoking status: Former Smoker     Packs/day: 3.00     Years: 30.00     Pack years: 90.00     Types: Cigarettes     Quit date: 6/10/2000     Years since quittin.7   • Smokeless tobacco: Former User     Types: Chew     Quit date:    Vaping Use   • Vaping Use: Never used   Substance and Sexual Activity   • Alcohol use: No     Comment: QUIT IN ; PT SELF ADMITTED ALCOHOLIC, DRANK FOR MANY YEARS OF DRINKING   • Drug use: No   • Sexual activity: Defer       Family History   Problem Relation Age of Onset   • Bone cancer Mother    • Stroke Mother    • Hypertension Mother    • Colon cancer Father        Review of Systems   Constitutional: Positive  "for fatigue. Negative for chills, diaphoresis and fever.   HENT: Positive for trouble swallowing ( ).    Eyes: Positive for visual disturbance.   Respiratory: Positive for cough, choking ( since carotid endarterectomy ) and shortness of breath (on exertion ). Negative for apnea, chest tightness and wheezing.    Cardiovascular: Positive for palpitations (occas). Negative for chest pain and leg swelling.   Gastrointestinal: Negative.  Negative for abdominal pain, blood in stool, constipation, diarrhea, nausea and vomiting.   Endocrine: Negative.    Genitourinary: Negative.  Negative for hematuria.   Musculoskeletal: Positive for arthralgias, back pain, myalgias and neck pain.   Skin: Negative.    Allergic/Immunologic: Negative.  Negative for environmental allergies and food allergies.   Neurological: Positive for weakness (legs). Negative for dizziness, syncope, light-headedness, numbness and headaches.   Hematological: Negative.  Does not bruise/bleed easily.   Psychiatric/Behavioral: Positive for sleep disturbance (insomnia). Negative for agitation. The patient is not nervous/anxious.        Objective   Vitals:    03/23/21 1546   BP: 144/78   BP Location: Left arm   Patient Position: Sitting   Pulse: 70   SpO2: 92%   Weight: 87.3 kg (192 lb 6.4 oz)   Height: 172.7 cm (67.99\")      /78 (BP Location: Left arm, Patient Position: Sitting)   Pulse 70   Ht 172.7 cm (67.99\")   Wt 87.3 kg (192 lb 6.4 oz)   SpO2 92%   BMI 29.26 kg/m²    Lab Results (most recent)     None        Physical Exam  Vitals and nursing note reviewed.   Constitutional:       General: He is not in acute distress.     Appearance: He is well-developed.   HENT:      Head: Normocephalic and atraumatic.   Eyes:      Conjunctiva/sclera: Conjunctivae normal.      Pupils: Pupils are equal, round, and reactive to light.   Neck:      Vascular: Carotid bruit (Left) present. No JVD.      Trachea: No tracheal deviation.   Cardiovascular:      Rate and " Rhythm: Normal rate and regular rhythm.      Heart sounds: Normal heart sounds.   Pulmonary:      Effort: Pulmonary effort is normal.      Breath sounds: Normal breath sounds.   Abdominal:      General: Bowel sounds are normal. There is no distension.      Palpations: Abdomen is soft. There is no mass.      Tenderness: There is no abdominal tenderness. There is no guarding or rebound.   Musculoskeletal:         General: No tenderness or deformity. Normal range of motion.      Cervical back: Normal range of motion and neck supple.   Skin:     General: Skin is warm and dry.      Coloration: Skin is not pale.      Findings: No erythema or rash.   Neurological:      Mental Status: He is alert and oriented to person, place, and time.   Psychiatric:         Behavior: Behavior normal.         Thought Content: Thought content normal.         Judgment: Judgment normal.           Procedure   Procedures       Assessment/Plan      Diagnosis Plan   1. PVD (peripheral vascular disease) (CMS/HCC)     2. Shortness of breath     3. Essential hypertension       1.  The patient is doing well post left carotid endarterectomy.  He had no complications during or after surgery.  He had no cardiac issues valeria or postoperatively either.  Stress test and echo performed in the preoperative setting to evaluate for cardiac status prior to surgery were benign as above.  I do not feel anything further would be indicated at this time.    2.  Symptomatically, he is doing well at this time.  He has stable dyspnea.  He has no further cardiovascular issues or symptoms.    3.  He remains normotensive for the most part at this time.  He will monitor blood pressures closely and call to us for any issues.    4.  I would make no adjustments in medical regimen.  We will continue to see him on 6-month intervals at this time.           Wiliam JOHANA Bergeron  reports that he quit smoking about 20 years ago. His smoking use included cigarettes. He has a 90.00  pack-year smoking history. He quit smokeless tobacco use about 36 years ago.  His smokeless tobacco use included chew..       Patient's Body mass index is 29.26 kg/m². BMI is above normal parameters. Recommendations include: educational material.     Advance Care Planning   ACP discussion was held with the patient during this visit. Patient does not have an advance directive, declines further assistance.        Electronically signed by:

## 2021-03-23 NOTE — PATIENT INSTRUCTIONS

## 2021-08-31 ENCOUNTER — TELEPHONE (OUTPATIENT)
Dept: CARDIAC SURGERY | Facility: CLINIC | Age: 76
End: 2021-08-31

## 2021-09-27 ENCOUNTER — OFFICE VISIT (OUTPATIENT)
Dept: CARDIOLOGY | Facility: CLINIC | Age: 76
End: 2021-09-27

## 2021-09-27 VITALS
DIASTOLIC BLOOD PRESSURE: 76 MMHG | RESPIRATION RATE: 16 BRPM | SYSTOLIC BLOOD PRESSURE: 135 MMHG | OXYGEN SATURATION: 92 % | BODY MASS INDEX: 28.79 KG/M2 | HEART RATE: 70 BPM | HEIGHT: 68 IN | WEIGHT: 190 LBS

## 2021-09-27 DIAGNOSIS — R09.89 LEFT CAROTID BRUIT: ICD-10-CM

## 2021-09-27 DIAGNOSIS — I10 ESSENTIAL HYPERTENSION: ICD-10-CM

## 2021-09-27 DIAGNOSIS — R06.02 SHORTNESS OF BREATH: ICD-10-CM

## 2021-09-27 DIAGNOSIS — I73.9 PVD (PERIPHERAL VASCULAR DISEASE) (HCC): Primary | ICD-10-CM

## 2021-09-27 PROCEDURE — 99213 OFFICE O/P EST LOW 20 MIN: CPT | Performed by: PHYSICIAN ASSISTANT

## 2021-09-27 NOTE — PATIENT INSTRUCTIONS
"Fat and Cholesterol Restricted Eating Plan  Getting too much fat and cholesterol in your diet may cause health problems. Choosing the right foods helps keep your fat and cholesterol at normal levels. This can keep you from getting certain diseases.  Your doctor may recommend an eating plan that includes:  · Total fat: ______% or less of total calories a day.  · Saturated fat: ______% or less of total calories a day.  · Cholesterol: less than _________mg a day.  · Fiber: ______g a day.  What are tips for following this plan?  Meal planning  · At meals, divide your plate into four equal parts:  ? Fill one-half of your plate with vegetables and green salads.  ? Fill one-fourth of your plate with whole grains.  ? Fill one-fourth of your plate with low-fat (lean) protein foods.  · Eat fish that is high in omega-3 fats at least two times a week. This includes mackerel, tuna, sardines, and salmon.  · Eat foods that are high in fiber, such as whole grains, beans, apples, broccoli, carrots, peas, and barley.  General tips    · Work with your doctor to lose weight if you need to.  · Avoid:  ? Foods with added sugar.  ? Fried foods.  ? Foods with partially hydrogenated oils.  · Limit alcohol intake to no more than 1 drink a day for nonpregnant women and 2 drinks a day for men. One drink equals 12 oz of beer, 5 oz of wine, or 1½ oz of hard liquor.    Reading food labels  · Check food labels for:  ? Trans fats.  ? Partially hydrogenated oils.  ? Saturated fat (g) in each serving.  ? Cholesterol (mg) in each serving.  ? Fiber (g) in each serving.  · Choose foods with healthy fats, such as:  ? Monounsaturated fats.  ? Polyunsaturated fats.  ? Omega-3 fats.  · Choose grain products that have whole grains. Look for the word \"whole\" as the first word in the ingredient list.  Cooking  · Cook foods using low-fat methods. These include baking, boiling, grilling, and broiling.  · Eat more home-cooked foods. Eat at restaurants and buffets " less often.  · Avoid cooking using saturated fats, such as butter, cream, palm oil, palm kernel oil, and coconut oil.  Recommended foods    Fruits  · All fresh, canned (in natural juice), or frozen fruits.  Vegetables  · Fresh or frozen vegetables (raw, steamed, roasted, or grilled). Green salads.  Grains  · Whole grains, such as whole wheat or whole grain breads, crackers, cereals, and pasta. Unsweetened oatmeal, bulgur, barley, quinoa, or brown rice. Corn or whole wheat flour tortillas.  Meats and other protein foods  · Ground beef (85% or leaner), grass-fed beef, or beef trimmed of fat. Skinless chicken or turkey. Ground chicken or turkey. Pork trimmed of fat. All fish and seafood. Egg whites. Dried beans, peas, or lentils. Unsalted nuts or seeds. Unsalted canned beans. Nut butters without added sugar or oil.  Dairy  · Low-fat or nonfat dairy products, such as skim or 1% milk, 2% or reduced-fat cheeses, low-fat and fat-free ricotta or cottage cheese, or plain low-fat and nonfat yogurt.  Fats and oils  · Tub margarine without trans fats. Light or reduced-fat mayonnaise and salad dressings. Avocado. Olive, canola, sesame, or safflower oils.  The items listed above may not be a complete list of foods and beverages you can eat. Contact a dietitian for more information.  Foods to avoid  Fruits  · Canned fruit in heavy syrup. Fruit in cream or butter sauce. Fried fruit.  Vegetables  · Vegetables cooked in cheese, cream, or butter sauce. Fried vegetables.  Grains  · White bread. White pasta. White rice. Cornbread. Bagels, pastries, and croissants. Crackers and snack foods that contain trans fat and hydrogenated oils.  Meats and other protein foods  · Fatty cuts of meat. Ribs, chicken wings, sousa, sausage, bologna, salami, chitterlings, fatback, hot dogs, bratwurst, and packaged lunch meats. Liver and organ meats. Whole eggs and egg yolks. Chicken and turkey with skin. Fried meat.  Dairy  · Whole or 2% milk, cream,  half-and-half, and cream cheese. Whole milk cheeses. Whole-fat or sweetened yogurt. Full-fat cheeses. Nondairy creamers and whipped toppings. Processed cheese, cheese spreads, and cheese curds.  Beverages  · Alcohol. Sugar-sweetened drinks such as sodas, lemonade, and fruit drinks.  Fats and oils  · Butter, stick margarine, lard, shortening, ghee, or sousa fat. Coconut, palm kernel, and palm oils.  Sweets and desserts  · Corn syrup, sugars, honey, and molasses. Candy. Jam and jelly. Syrup. Sweetened cereals. Cookies, pies, cakes, donuts, muffins, and ice cream.  The items listed above may not be a complete list of foods and beverages you should avoid. Contact a dietitian for more information.  Summary  · Choosing the right foods helps keep your fat and cholesterol at normal levels. This can keep you from getting certain diseases.  · At meals, fill one-half of your plate with vegetables and green salads.  · Eat high-fiber foods, like whole grains, beans, apples, carrots, peas, and barley.  · Limit added sugar, saturated fats, alcohol, and fried foods.  This information is not intended to replace advice given to you by your health care provider. Make sure you discuss any questions you have with your health care provider.  Document Revised: 04/21/2021 Document Reviewed: 04/21/2021  [employer] Patient Education © 2021 [employer]  BMI for Adults  What is BMI?  Body mass index (BMI) is a number that is calculated from a person's weight and height. BMI can help estimate how much of a person's weight is composed of fat. BMI does not measure body fat directly. Rather, it is an alternative to procedures that directly measure body fat, which can be difficult and expensive.  BMI can help identify people who may be at higher risk for certain medical problems.  What are BMI measurements used for?  BMI is used as a screening tool to identify possible weight problems. It helps determine whether a person is obese, overweight, a  "healthy weight, or underweight.  BMI is useful for:  · Identifying a weight problem that may be related to a medical condition or may increase the risk for medical problems.  · Promoting changes, such as changes in diet and exercise, to help reach a healthy weight. BMI screening can be repeated to see if these changes are working.  How is BMI calculated?  BMI involves measuring your weight in relation to your height. Both height and weight are measured, and the BMI is calculated from those numbers. This can be done either in English ([place] or metric measurements. Note that charts and online BMI calculators are available to help you find your BMI quickly and easily without having to do these calculations yourself.  To calculate your BMI in English ([place] measurements:    1. Measure your weight in pounds (lb).  2. Multiply the number of pounds by 703.  ? For example, for a person who weighs 180 lb, multiply that number by 703, which equals 126,540.  3. Measure your height in inches. Then multiply that number by itself to get a measurement called \"inches squared.\"  ? For example, for a person who is 70 inches tall, the \"inches squared\" measurement is 70 inches x 70 inches, which equals 4,900 inches squared.  4. Divide the total from step 2 (number of lb x 703) by the total from step 3 (inches squared): 126,540 ÷ 4,900 = 25.8. This is your BMI.    To calculate your BMI in metric measurements:  1. Measure your weight in kilograms (kg).  2. Measure your height in meters (m). Then multiply that number by itself to get a measurement called \"meters squared.\"  ? For example, for a person who is 1.75 m tall, the \"meters squared\" measurement is 1.75 m x 1.75 m, which is equal to 3.1 meters squared.  3. Divide the number of kilograms (your weight) by the meters squared number. In this example: 70 ÷ 3.1 = 22.6. This is your BMI.  What do the results mean?  BMI charts are used to identify whether you are underweight, normal " weight, overweight, or obese. The following guidelines will be used:  · Underweight: BMI less than 18.5.  · Normal weight: BMI between 18.5 and 24.9.  · Overweight: BMI between 25 and 29.9.  · Obese: BMI of 30 or above.  Keep these notes in mind:  · Weight includes both fat and muscle, so someone with a muscular build, such as an athlete, may have a BMI that is higher than 24.9. In cases like these, BMI is not an accurate measure of body fat.  · To determine if excess body fat is the cause of a BMI of 25 or higher, further assessments may need to be done by a health care provider.  · BMI is usually interpreted in the same way for men and women.  Where to find more information  For more information about BMI, including tools to quickly calculate your BMI, go to these websites:  · Centers for Disease Control and Prevention: www.cdc.gov  · American Heart Association: www.heart.org  · National Heart, Lung, and Blood [employer]: www.nhlbi.nih.gov  Summary  · Body mass index (BMI) is a number that is calculated from a person's weight and height.  · BMI may help estimate how much of a person's weight is composed of fat. BMI can help identify those who may be at higher risk for certain medical problems.  · BMI can be measured using English measurements or metric measurements.  · BMI charts are used to identify whether you are underweight, normal weight, overweight, or obese.  This information is not intended to replace advice given to you by your health care provider. Make sure you discuss any questions you have with your health care provider.  Document Revised: 09/09/2020 Document Reviewed: 07/17/2020  [employer] Patient Education © 2021 [employer]

## 2021-09-27 NOTE — PROGRESS NOTES
Problem list     Subjective   Wiliam Bergeron is a 76 y.o. male     Chief Complaint   Patient presents with   • Hypertension   • Hyperlipidemia   Problem List:  1.  Peripheral vascular disease.  1.1.  History of right carotid endarterectomy, 2016.  1.2.  Left carotid endarterectomy, 12/2020.  2.  Low risk stress test, 11/2020.  3.  Preserved systolic function  4.  Hypertension  5.  Dyslipidemia    HPI  The patient presents into the clinic today for routine follow-up.  He continues to do fairly well from general cardiovascular standpoint.  He currently denies chest pain.  He has stable dyspnea.  He has no failure or dysrhythmic symptoms.  To his knowledge, blood pressures are typically controlled.  Laboratories are followed with his primary care provider.  The patient has no further complaints otherwise and feels that he is doing well.    Current Outpatient Medications on File Prior to Visit   Medication Sig Dispense Refill   • aspirin (aspirin) 81 MG EC tablet Take 81 mg by mouth Daily.     • cholecalciferol (VITAMIN D3) 25 MCG (1000 UT) tablet Take 1,000 Units by mouth Daily.     • enalapril (VASOTEC) 20 MG tablet Take 10 mg by mouth 2 (Two) Times a Day.     • Glucosamine 500 MG capsule Take 1,000 mg by mouth Daily.     • levothyroxine (SYNTHROID, LEVOTHROID) 50 MCG tablet Take 50 mcg by mouth Daily.     • metoprolol tartrate (LOPRESSOR) 50 MG tablet Take 25 mg by mouth 2 (two) times a day.     • Multiple Vitamin (MULTI VITAMIN DAILY PO) Take 1 tablet by mouth daily.     • NIFEdipine XL (PROCARDIA XL) 90 MG 24 hr tablet Take 90 mg by mouth daily.     • Omega-3 Fatty Acids (FISH OIL) 1000 MG capsule capsule Take 1 capsule by mouth daily with breakfast.     • omeprazole (priLOSEC) 20 MG capsule Take 1 capsule by mouth Daily.     • rosuvastatin (CRESTOR) 20 MG tablet Take 20 mg by mouth daily.       No current facility-administered medications on file prior to visit.       Augmentin [amoxicillin-pot clavulanate] and  Penicillins    Past Medical History:   Diagnosis Date   • Arthritis    • Back pain    • Carotid artery disease (CMS/HCC)    • Cataract    • Chronic kidney disease (CKD)    • Colon cancer (CMS/HCC)    • Creatinine elevation     MONITORED BY [facility]MD-DR. BJ POON, GETS CHECKED EVERY FEW MONTHS   • Depression    • Diabetes (CMS/HCC)    • Dizziness    • GERD (gastroesophageal reflux disease)    • Hepatitis    • Hyperlipidemia    • Hypertension     CHECKS AT HOME, SAYS HAS BEEN RUNNING JUST A LITTLE LOW RECENTLY BUT NOT EXTREMELY LOW   • Inguinal hernia     right side   • Kidney disease    • PONV (postoperative nausea and vomiting)     ONLY WITH COLON SURGERY   • Skin cancer     right cheek   • Tobacco abuse, in remission    • Wears dentures    • Wears glasses        Social History     Socioeconomic History   • Marital status:      Spouse name: Not on file   • Number of children: 2   • Years of education: 13   • Highest education level: Not on file   Tobacco Use   • Smoking status: Former Smoker     Packs/day: 3.00     Years: 30.00     Pack years: 90.00     Types: Cigarettes     Quit date: 6/10/2000     Years since quittin.3   • Smokeless tobacco: Former User     Types: Chew     Quit date:    Vaping Use   • Vaping Use: Never used   Substance and Sexual Activity   • Alcohol use: No     Comment: QUIT IN ; PT SELF ADMITTED ALCOHOLIC, DRANK FOR MANY YEARS OF DRINKING   • Drug use: No   • Sexual activity: Defer       Family History   Problem Relation Age of Onset   • Bone cancer Mother    • Stroke Mother    • Hypertension Mother    • Colon cancer Father        Review of Systems   Constitutional: Negative for activity change, appetite change and fatigue.   HENT: Negative for facial swelling and tinnitus.    Eyes: Positive for visual disturbance (wears eye glasses). Negative for photophobia.   Respiratory: Negative for apnea, chest tightness and shortness of breath.    Cardiovascular: Negative for chest  "pain, palpitations and leg swelling.   Gastrointestinal: Negative for abdominal distention, abdominal pain, constipation and diarrhea.   Endocrine: Negative for cold intolerance and heat intolerance.   Genitourinary: Negative.    Musculoskeletal: Negative for arthralgias, gait problem and myalgias.   Skin: Negative for color change and rash.   Allergic/Immunologic: Negative for environmental allergies and food allergies.   Neurological: Negative for dizziness, syncope, weakness, light-headedness and numbness.   Hematological: Does not bruise/bleed easily.   Psychiatric/Behavioral: Positive for sleep disturbance (trouble sleeping). Negative for agitation and suicidal ideas. The patient is not nervous/anxious.        Objective   Vitals:    09/27/21 1617   BP: 135/76   BP Location: Left arm   Patient Position: Sitting   Pulse: 70   Resp: 16   SpO2: 92%   Weight: 86.2 kg (190 lb)   Height: 172.7 cm (68\")      /76 (BP Location: Left arm, Patient Position: Sitting)   Pulse 70   Resp 16   Ht 172.7 cm (68\")   Wt 86.2 kg (190 lb)   SpO2 92%   BMI 28.89 kg/m²    Lab Results (most recent)     None        Physical Exam  Vitals and nursing note reviewed.   Constitutional:       General: He is not in acute distress.     Appearance: He is well-developed.   HENT:      Head: Normocephalic and atraumatic.   Eyes:      Conjunctiva/sclera: Conjunctivae normal.      Pupils: Pupils are equal, round, and reactive to light.   Neck:      Vascular: Carotid bruit (Left) present. No JVD.      Trachea: No tracheal deviation.   Cardiovascular:      Rate and Rhythm: Normal rate and regular rhythm.      Heart sounds: Normal heart sounds.   Pulmonary:      Effort: Pulmonary effort is normal.      Breath sounds: Normal breath sounds.   Abdominal:      General: Bowel sounds are normal. There is no distension.      Palpations: Abdomen is soft. There is no mass.      Tenderness: There is no abdominal tenderness. There is no guarding or " rebound.   Musculoskeletal:         General: No tenderness or deformity. Normal range of motion.      Cervical back: Normal range of motion and neck supple.   Skin:     General: Skin is warm and dry.      Coloration: Skin is not pale.      Findings: No erythema or rash.   Neurological:      Mental Status: He is alert and oriented to person, place, and time.   Psychiatric:         Behavior: Behavior normal.         Thought Content: Thought content normal.         Judgment: Judgment normal.           Procedure   Procedures       Assessment/Plan      Diagnosis Plan   1. PVD (peripheral vascular disease) (HCC)  Duplex Carotid Ultrasound CAR   2. Shortness of breath  Duplex Carotid Ultrasound CAR   3. Essential hypertension  Duplex Carotid Ultrasound CAR   4. Left carotid bruit  Duplex Carotid Ultrasound CAR       1.  At this time, the patient appears to be doing well from general cardiovascular standpoint.  Currently, the patient denies angina, failure, or dysrhythmias.    2.  I would like to schedule the patient for carotid duplex to be done in December.  This will be a year after his previous study.  We can ensure stability of carotid systems.    3.  I would continue medical regimen without change.    4.  He will continue to monitor blood pressures and call to us for any issues.  Nothing further for now and we will see him routinely on 6-month intervals.  We will see him back immediately if problems are noted with his carotid duplex.         Wiliam Bergeron  reports that he quit smoking about 21 years ago. His smoking use included cigarettes. He has a 90.00 pack-year smoking history. He quit smokeless tobacco use about 36 years ago.  His smokeless tobacco use included chew.      Patient's Body mass index is 28.89 kg/m². indicating that he is within normal range (BMI 18.5-24.9). No BMI management plan needed..             Electronically signed by:

## 2021-10-05 ENCOUNTER — HOSPITAL ENCOUNTER (OUTPATIENT)
Dept: CARDIOLOGY | Facility: HOSPITAL | Age: 76
Discharge: HOME | End: 2021-10-05
Admitting: PHYSICIAN ASSISTANT

## 2021-10-05 DIAGNOSIS — I73.9 PVD (PERIPHERAL VASCULAR DISEASE) (HCC): ICD-10-CM

## 2021-10-05 DIAGNOSIS — I10 ESSENTIAL HYPERTENSION: ICD-10-CM

## 2021-10-05 DIAGNOSIS — R06.02 SHORTNESS OF BREATH: ICD-10-CM

## 2021-10-05 DIAGNOSIS — R09.89 LEFT CAROTID BRUIT: ICD-10-CM

## 2021-10-05 PROCEDURE — 93880 EXTRACRANIAL BILAT STUDY: CPT

## 2021-10-05 PROCEDURE — 93880 EXTRACRANIAL BILAT STUDY: CPT | Performed by: INTERNAL MEDICINE

## 2021-10-21 LAB
BH CV ECHO MEAS - BSA(HAYCOCK): 2.1 M^2
BH CV ECHO MEAS - BSA: 2 M^2
BH CV ECHO MEAS - BZI_BMI: 28.9 KILOGRAMS/M^2
BH CV ECHO MEAS - BZI_METRIC_HEIGHT: 172.7 CM
BH CV ECHO MEAS - BZI_METRIC_WEIGHT: 86.2 KG
BH CV XLRA MEAS LEFT BULB EDV: -20.6 CM/SEC
BH CV XLRA MEAS LEFT BULB PSV: -91.3 CM/SEC
BH CV XLRA MEAS LEFT CCA RATIO VEL: 84 CM/SEC
BH CV XLRA MEAS LEFT DIST CCA EDV: 18.2 CM/SEC
BH CV XLRA MEAS LEFT DIST CCA PSV: 84.5 CM/SEC
BH CV XLRA MEAS LEFT DIST ICA EDV: -32.2 CM/SEC
BH CV XLRA MEAS LEFT DIST ICA PSV: -96.6 CM/SEC
BH CV XLRA MEAS LEFT ICA RATIO VEL: -116 CM/SEC
BH CV XLRA MEAS LEFT ICA/CCA RATIO: -1.4
BH CV XLRA MEAS LEFT MID ICA EDV: -35.4 CM/SEC
BH CV XLRA MEAS LEFT MID ICA PSV: -117.1 CM/SEC
BH CV XLRA MEAS LEFT PROX CCA EDV: 20.1 CM/SEC
BH CV XLRA MEAS LEFT PROX CCA PSV: 88.4 CM/SEC
BH CV XLRA MEAS LEFT PROX ECA EDV: -20.4 CM/SEC
BH CV XLRA MEAS LEFT PROX ECA PSV: -143 CM/SEC
BH CV XLRA MEAS LEFT PROX ICA EDV: -33.8 CM/SEC
BH CV XLRA MEAS LEFT PROX ICA PSV: -116.3 CM/SEC
BH CV XLRA MEAS LEFT VERTEBRAL A EDV: 16.7 CM/SEC
BH CV XLRA MEAS LEFT VERTEBRAL A PSV: 46.7 CM/SEC
BH CV XLRA MEAS RIGHT BULB EDV: 12.7 CM/SEC
BH CV XLRA MEAS RIGHT BULB PSV: 81.2 CM/SEC
BH CV XLRA MEAS RIGHT CCA RATIO VEL: 75.5 CM/SEC
BH CV XLRA MEAS RIGHT DIST CCA EDV: 13.1 CM/SEC
BH CV XLRA MEAS RIGHT DIST CCA PSV: 76 CM/SEC
BH CV XLRA MEAS RIGHT DIST ICA EDV: -32.9 CM/SEC
BH CV XLRA MEAS RIGHT DIST ICA PSV: -114.9 CM/SEC
BH CV XLRA MEAS RIGHT ICA RATIO VEL: -114 CM/SEC
BH CV XLRA MEAS RIGHT ICA/CCA RATIO: -1.5
BH CV XLRA MEAS RIGHT MID ICA EDV: -23.1 CM/SEC
BH CV XLRA MEAS RIGHT MID ICA PSV: -84.3 CM/SEC
BH CV XLRA MEAS RIGHT PROX CCA EDV: 13.8 CM/SEC
BH CV XLRA MEAS RIGHT PROX CCA PSV: 67.3 CM/SEC
BH CV XLRA MEAS RIGHT PROX ECA EDV: -8.8 CM/SEC
BH CV XLRA MEAS RIGHT PROX ECA PSV: -104.3 CM/SEC
BH CV XLRA MEAS RIGHT PROX ICA EDV: -16.6 CM/SEC
BH CV XLRA MEAS RIGHT PROX ICA PSV: -60.7 CM/SEC
BH CV XLRA MEAS RIGHT VERTEBRAL A EDV: 15.2 CM/SEC
BH CV XLRA MEAS RIGHT VERTEBRAL A PSV: 59.4 CM/SEC

## 2021-10-22 ENCOUNTER — TELEPHONE (OUTPATIENT)
Dept: CARDIOLOGY | Facility: CLINIC | Age: 76
End: 2021-10-22

## 2021-10-22 NOTE — TELEPHONE ENCOUNTER
----- Message from Alisa Goodman MA sent at 10/21/2021  5:45 PM EDT -----    ----- Message -----  From: Shlomo Zaldivar PA  Sent: 10/21/2021   5:39 PM EDT  To: Alisa Goodman MA    Routine follow-up.

## 2022-02-24 ENCOUNTER — TELEPHONE (OUTPATIENT)
Dept: CARDIOLOGY | Facility: CLINIC | Age: 77
End: 2022-02-24

## 2022-02-24 NOTE — TELEPHONE ENCOUNTER
[phone]    Patient has apt in September but is not feeling well would like to be seen. It has been a month ago blood pressure was 90/50 hrt 66. He saw Dr Zuñiga they did not do much. Patient has been feeling dizzy light headed.Patient thinks it may be related to Levothyroxin.     Shlomo CABRAL reviewed chart decrease Enalapril to 10 mg po qd .     Patient has been taking Procardia 30 mg per Dr Zuñiga. His blood pressure last night was 171/92. This morning before medication 105/50 115/60. He did not take anything so far this morning. He has not cut back Enalapril yet .        Per Shlomo CABRAL  Take Enalapril

## 2022-02-25 NOTE — TELEPHONE ENCOUNTER
Called patient Shlomo CABRAL said to have patient  Take Enalapril in afternoon. If blood pressure is still low will Decrease Procardia .